# Patient Record
Sex: FEMALE | Race: BLACK OR AFRICAN AMERICAN | Employment: FULL TIME | URBAN - NONMETROPOLITAN AREA
[De-identification: names, ages, dates, MRNs, and addresses within clinical notes are randomized per-mention and may not be internally consistent; named-entity substitution may affect disease eponyms.]

---

## 2020-12-16 ENCOUNTER — ANESTHESIA EVENT (OUTPATIENT)
Dept: OPERATING ROOM | Age: 40
End: 2020-12-16
Payer: COMMERCIAL

## 2020-12-17 RX ORDER — CHLORTHALIDONE 25 MG/1
25 TABLET ORAL DAILY
COMMUNITY

## 2020-12-17 NOTE — ANESTHESIA PRE PROCEDURE
Department of Anesthesiology  Preprocedure Note       Name:  Amalia Petty   Age:  36 y.o.  :  1980                                          MRN:  [id number]         Date:  2020      Surgeon: Nieves Small):  Shi Santana DPM    Procedure: Procedure(s):  RIGHT 1ST METCUNIEFORM FUSION FOOT ARTHRODESIS  RIGHT GILLILAND FOOT BUNIONECTOMY    Medications prior to admission:   Prior to Admission medications    Medication Sig Start Date End Date Taking? Authorizing Provider   chlorthalidone (HYGROTON) 25 MG tablet Take 25 mg by mouth daily   Yes Historical Provider, MD   Flaxseed, Linseed, (FLAX SEED OIL PO) Take 1 drop by mouth Takes 1 dropper full   Yes Historical Provider, MD       Current medications:    No current facility-administered medications for this encounter. Current Outpatient Medications   Medication Sig Dispense Refill    chlorthalidone (HYGROTON) 25 MG tablet Take 25 mg by mouth daily      Flaxseed, Linseed, (FLAX SEED OIL PO) Take 1 drop by mouth Takes 1 dropper full         Allergies: Allergies   Allergen Reactions    Pcn [Penicillins] Hives       Problem List:  There is no problem list on file for this patient.       Past Medical History:        Diagnosis Date    Hypertension        Past Surgical History:        Procedure Laterality Date    ECTOPIC PREGNANCY SURGERY  2006   Floydene Ada SINUS SURGERY  2009    TUBAL LIGATION         Social History:    Social History     Tobacco Use    Smoking status: Never Smoker    Smokeless tobacco: Never Used   Substance Use Topics    Alcohol use: Yes     Comment: 4 bottles of wine/month                                Counseling given: Not Answered      Vital Signs (Current):   Vitals:    20 0949   Weight: 230 lb (104.3 kg)   Height: 5' 1\" (1.549 m)                                              BP Readings from Last 3 Encounters:   No data found for BP       NPO Status: BMI:   Wt Readings from Last 3 Encounters:   No data found for Wt     Body mass index is 43.46 kg/m². CBC: No results found for: WBC, RBC, HGB, HCT, MCV, RDW, PLT    CMP: No results found for: NA, K, CL, CO2, BUN, CREATININE, GFRAA, AGRATIO, LABGLOM, GLUCOSE, PROT, CALCIUM, BILITOT, ALKPHOS, AST, ALT    POC Tests: No results for input(s): POCGLU, POCNA, POCK, POCCL, POCBUN, POCHEMO, POCHCT in the last 72 hours. Coags: No results found for: PROTIME, INR, APTT    HCG (If Applicable): No results found for: PREGTESTUR, PREGSERUM, HCG, HCGQUANT     ABGs: No results found for: PHART, PO2ART, MVH4BSV, OHD7VTL, BEART, L0CAESPD     Type & Screen (If Applicable):  No results found for: LABABO, LABRH    Drug/Infectious Status (If Applicable):  No results found for: HIV, HEPCAB    COVID-19 Screening (If Applicable): No results found for: COVID19      Anesthesia Evaluation  Patient summary reviewed and Nursing notes reviewed no history of anesthetic complications:   Airway:         Dental:          Pulmonary:Negative Pulmonary ROS                              Cardiovascular:    (+) hypertension:,          Beta Blocker:  Not on Beta Blocker         Neuro/Psych:   Negative Neuro/Psych ROS              GI/Hepatic/Renal:   (+) morbid obesity          Endo/Other: Negative Endo/Other ROS                    Abdominal:           Vascular: negative vascular ROS. Anesthesia Plan      general     ASA 2     (Chart review only.)  Induction: intravenous.                           DENZEL Luciaon CRNA   12/17/2020

## 2020-12-18 ENCOUNTER — HOSPITAL ENCOUNTER (OUTPATIENT)
Age: 40
Setting detail: OUTPATIENT SURGERY
Discharge: HOME | End: 2020-12-18
Attending: PODIATRIST | Admitting: PODIATRIST
Payer: COMMERCIAL

## 2020-12-18 ENCOUNTER — ANESTHESIA (OUTPATIENT)
Dept: OPERATING ROOM | Age: 40
End: 2020-12-18
Payer: COMMERCIAL

## 2020-12-18 ENCOUNTER — APPOINTMENT (OUTPATIENT)
Dept: GENERAL RADIOLOGY | Age: 40
End: 2020-12-18
Attending: PODIATRIST
Payer: COMMERCIAL

## 2020-12-18 VITALS
HEART RATE: 87 BPM | OXYGEN SATURATION: 99 % | WEIGHT: 230 LBS | HEIGHT: 61 IN | TEMPERATURE: 96.3 F | BODY MASS INDEX: 43.43 KG/M2 | RESPIRATION RATE: 16 BRPM | SYSTOLIC BLOOD PRESSURE: 133 MMHG | DIASTOLIC BLOOD PRESSURE: 91 MMHG

## 2020-12-18 VITALS
DIASTOLIC BLOOD PRESSURE: 58 MMHG | TEMPERATURE: 98.6 F | OXYGEN SATURATION: 100 % | SYSTOLIC BLOOD PRESSURE: 112 MMHG | RESPIRATION RATE: 2 BRPM

## 2020-12-18 LAB — PREGNANCY TEST URINE, POC: NEGATIVE

## 2020-12-18 PROCEDURE — 3700000000 HC ANESTHESIA ATTENDED CARE: Performed by: PODIATRIST

## 2020-12-18 PROCEDURE — 6360000002 HC RX W HCPCS: Performed by: NURSE ANESTHETIST, CERTIFIED REGISTERED

## 2020-12-18 PROCEDURE — 73630 X-RAY EXAM OF FOOT: CPT

## 2020-12-18 PROCEDURE — 7100000010 HC PHASE II RECOVERY - FIRST 15 MIN: Performed by: PODIATRIST

## 2020-12-18 PROCEDURE — 2580000003 HC RX 258: Performed by: PODIATRIST

## 2020-12-18 PROCEDURE — 81025 URINE PREGNANCY TEST: CPT

## 2020-12-18 PROCEDURE — C1713 ANCHOR/SCREW BN/BN,TIS/BN: HCPCS | Performed by: PODIATRIST

## 2020-12-18 PROCEDURE — 2500000003 HC RX 250 WO HCPCS: Performed by: PODIATRIST

## 2020-12-18 PROCEDURE — 2720000010 HC SURG SUPPLY STERILE: Performed by: PODIATRIST

## 2020-12-18 PROCEDURE — 7100000011 HC PHASE II RECOVERY - ADDTL 15 MIN: Performed by: PODIATRIST

## 2020-12-18 PROCEDURE — 6370000000 HC RX 637 (ALT 250 FOR IP): Performed by: PODIATRIST

## 2020-12-18 PROCEDURE — 7100000000 HC PACU RECOVERY - FIRST 15 MIN: Performed by: PODIATRIST

## 2020-12-18 PROCEDURE — 7100000001 HC PACU RECOVERY - ADDTL 15 MIN: Performed by: PODIATRIST

## 2020-12-18 PROCEDURE — 3600000014 HC SURGERY LEVEL 4 ADDTL 15MIN: Performed by: PODIATRIST

## 2020-12-18 PROCEDURE — 3700000001 HC ADD 15 MINUTES (ANESTHESIA): Performed by: PODIATRIST

## 2020-12-18 PROCEDURE — 3600000004 HC SURGERY LEVEL 4 BASE: Performed by: PODIATRIST

## 2020-12-18 PROCEDURE — 2709999900 HC NON-CHARGEABLE SUPPLY: Performed by: PODIATRIST

## 2020-12-18 DEVICE — ANATOMIC BIPLANAR IMPLANTS
Type: IMPLANTABLE DEVICE | Site: FOOT | Status: FUNCTIONAL
Brand: LAPIPLASTY SYSTEM 2

## 2020-12-18 RX ORDER — DEXAMETHASONE SODIUM PHOSPHATE 4 MG/ML
INJECTION, SOLUTION INTRA-ARTICULAR; INTRALESIONAL; INTRAMUSCULAR; INTRAVENOUS; SOFT TISSUE PRN
Status: DISCONTINUED | OUTPATIENT
Start: 2020-12-18 | End: 2020-12-18 | Stop reason: SDUPTHER

## 2020-12-18 RX ORDER — FENTANYL CITRATE 50 UG/ML
INJECTION, SOLUTION INTRAMUSCULAR; INTRAVENOUS PRN
Status: DISCONTINUED | OUTPATIENT
Start: 2020-12-18 | End: 2020-12-18 | Stop reason: SDUPTHER

## 2020-12-18 RX ORDER — OXYCODONE HYDROCHLORIDE AND ACETAMINOPHEN 5; 325 MG/1; MG/1
1 TABLET ORAL ONCE
Status: COMPLETED | OUTPATIENT
Start: 2020-12-18 | End: 2020-12-18

## 2020-12-18 RX ORDER — LIDOCAINE HYDROCHLORIDE 20 MG/ML
INJECTION, SOLUTION INTRAVENOUS PRN
Status: DISCONTINUED | OUTPATIENT
Start: 2020-12-18 | End: 2020-12-18 | Stop reason: SDUPTHER

## 2020-12-18 RX ORDER — MIDAZOLAM HYDROCHLORIDE 1 MG/ML
INJECTION INTRAMUSCULAR; INTRAVENOUS PRN
Status: DISCONTINUED | OUTPATIENT
Start: 2020-12-18 | End: 2020-12-18 | Stop reason: SDUPTHER

## 2020-12-18 RX ORDER — FENTANYL CITRATE 50 UG/ML
25 INJECTION, SOLUTION INTRAMUSCULAR; INTRAVENOUS EVERY 5 MIN PRN
Status: DISCONTINUED | OUTPATIENT
Start: 2020-12-18 | End: 2020-12-18 | Stop reason: HOSPADM

## 2020-12-18 RX ORDER — ONDANSETRON 2 MG/ML
INJECTION INTRAMUSCULAR; INTRAVENOUS PRN
Status: DISCONTINUED | OUTPATIENT
Start: 2020-12-18 | End: 2020-12-18 | Stop reason: SDUPTHER

## 2020-12-18 RX ORDER — CLINDAMYCIN PHOSPHATE 900 MG/50ML
900 INJECTION INTRAVENOUS ONCE
Status: COMPLETED | OUTPATIENT
Start: 2020-12-18 | End: 2020-12-18

## 2020-12-18 RX ORDER — KETOROLAC TROMETHAMINE 30 MG/ML
INJECTION, SOLUTION INTRAMUSCULAR; INTRAVENOUS PRN
Status: DISCONTINUED | OUTPATIENT
Start: 2020-12-18 | End: 2020-12-18 | Stop reason: SDUPTHER

## 2020-12-18 RX ORDER — SODIUM CHLORIDE 0.9 % (FLUSH) 0.9 %
10 SYRINGE (ML) INJECTION ONCE
Status: COMPLETED | OUTPATIENT
Start: 2020-12-18 | End: 2020-12-18

## 2020-12-18 RX ORDER — SODIUM CHLORIDE, SODIUM LACTATE, POTASSIUM CHLORIDE, CALCIUM CHLORIDE 600; 310; 30; 20 MG/100ML; MG/100ML; MG/100ML; MG/100ML
INJECTION, SOLUTION INTRAVENOUS CONTINUOUS
Status: DISCONTINUED | OUTPATIENT
Start: 2020-12-18 | End: 2020-12-18 | Stop reason: HOSPADM

## 2020-12-18 RX ORDER — PROPOFOL 10 MG/ML
INJECTION, EMULSION INTRAVENOUS PRN
Status: DISCONTINUED | OUTPATIENT
Start: 2020-12-18 | End: 2020-12-18 | Stop reason: SDUPTHER

## 2020-12-18 RX ORDER — ONDANSETRON 2 MG/ML
4 INJECTION INTRAMUSCULAR; INTRAVENOUS
Status: DISCONTINUED | OUTPATIENT
Start: 2020-12-18 | End: 2020-12-18 | Stop reason: HOSPADM

## 2020-12-18 RX ORDER — BUPIVACAINE HYDROCHLORIDE 5 MG/ML
INJECTION, SOLUTION EPIDURAL; INTRACAUDAL
Status: COMPLETED | OUTPATIENT
Start: 2020-12-18 | End: 2020-12-18

## 2020-12-18 RX ADMIN — MIDAZOLAM 2 MG: 1 INJECTION INTRAMUSCULAR; INTRAVENOUS at 07:51

## 2020-12-18 RX ADMIN — FENTANYL CITRATE 50 MCG: 50 INJECTION INTRAMUSCULAR; INTRAVENOUS at 08:08

## 2020-12-18 RX ADMIN — PROPOFOL 200 MG: 10 INJECTION, EMULSION INTRAVENOUS at 07:58

## 2020-12-18 RX ADMIN — FENTANYL CITRATE 50 MCG: 50 INJECTION INTRAMUSCULAR; INTRAVENOUS at 08:12

## 2020-12-18 RX ADMIN — FENTANYL CITRATE 25 MCG: 50 INJECTION INTRAMUSCULAR; INTRAVENOUS at 09:52

## 2020-12-18 RX ADMIN — FENTANYL CITRATE 25 MCG: 50 INJECTION INTRAMUSCULAR; INTRAVENOUS at 09:20

## 2020-12-18 RX ADMIN — LIDOCAINE HYDROCHLORIDE 100 MG: 20 INJECTION, SOLUTION INTRAVENOUS at 07:58

## 2020-12-18 RX ADMIN — DEXAMETHASONE SODIUM PHOSPHATE 8 MG: 4 INJECTION, SOLUTION INTRAMUSCULAR; INTRAVENOUS at 08:08

## 2020-12-18 RX ADMIN — ONDANSETRON HYDROCHLORIDE 4 MG: 4 INJECTION, SOLUTION INTRAMUSCULAR; INTRAVENOUS at 08:38

## 2020-12-18 RX ADMIN — FENTANYL CITRATE 25 MCG: 50 INJECTION INTRAMUSCULAR; INTRAVENOUS at 09:36

## 2020-12-18 RX ADMIN — SODIUM CHLORIDE, POTASSIUM CHLORIDE, SODIUM LACTATE AND CALCIUM CHLORIDE: 600; 310; 30; 20 INJECTION, SOLUTION INTRAVENOUS at 10:13

## 2020-12-18 RX ADMIN — FENTANYL CITRATE 100 MCG: 50 INJECTION INTRAMUSCULAR; INTRAVENOUS at 07:56

## 2020-12-18 RX ADMIN — CLINDAMYCIN IN 5 PERCENT DEXTROSE 900 MG: 18 INJECTION, SOLUTION INTRAVENOUS at 07:11

## 2020-12-18 RX ADMIN — SODIUM CHLORIDE, POTASSIUM CHLORIDE, SODIUM LACTATE AND CALCIUM CHLORIDE: 600; 310; 30; 20 INJECTION, SOLUTION INTRAVENOUS at 07:05

## 2020-12-18 RX ADMIN — CLINDAMYCIN IN 5 PERCENT DEXTROSE 900 MG: 18 INJECTION, SOLUTION INTRAVENOUS at 08:06

## 2020-12-18 RX ADMIN — FENTANYL CITRATE 25 MCG: 50 INJECTION INTRAMUSCULAR; INTRAVENOUS at 08:46

## 2020-12-18 RX ADMIN — KETOROLAC TROMETHAMINE 30 MG: 30 INJECTION, SOLUTION INTRAMUSCULAR; INTRAVENOUS at 10:11

## 2020-12-18 RX ADMIN — SODIUM CHLORIDE, PRESERVATIVE FREE 10 ML: 5 INJECTION INTRAVENOUS at 07:05

## 2020-12-18 RX ADMIN — OXYCODONE AND ACETAMINOPHEN 1 TABLET: 5; 325 TABLET ORAL at 11:22

## 2020-12-18 ASSESSMENT — PULMONARY FUNCTION TESTS
PIF_VALUE: 6
PIF_VALUE: 22
PIF_VALUE: 22
PIF_VALUE: 18
PIF_VALUE: 19
PIF_VALUE: 0
PIF_VALUE: 0
PIF_VALUE: 19
PIF_VALUE: 28
PIF_VALUE: 19
PIF_VALUE: 19
PIF_VALUE: 29
PIF_VALUE: 19
PIF_VALUE: 28
PIF_VALUE: 19
PIF_VALUE: 16
PIF_VALUE: 19
PIF_VALUE: 30
PIF_VALUE: 21
PIF_VALUE: 20
PIF_VALUE: 20
PIF_VALUE: 23
PIF_VALUE: 19
PIF_VALUE: 20
PIF_VALUE: 19
PIF_VALUE: 19
PIF_VALUE: 22
PIF_VALUE: 19
PIF_VALUE: 20
PIF_VALUE: 19
PIF_VALUE: 20
PIF_VALUE: 19
PIF_VALUE: 18
PIF_VALUE: 19
PIF_VALUE: 16
PIF_VALUE: 19
PIF_VALUE: 0
PIF_VALUE: 19
PIF_VALUE: 18
PIF_VALUE: 30
PIF_VALUE: 19
PIF_VALUE: 18
PIF_VALUE: 0
PIF_VALUE: 19
PIF_VALUE: 18
PIF_VALUE: 3
PIF_VALUE: 28
PIF_VALUE: 19
PIF_VALUE: 16
PIF_VALUE: 35
PIF_VALUE: 21
PIF_VALUE: 19
PIF_VALUE: 1
PIF_VALUE: 18
PIF_VALUE: 19
PIF_VALUE: 0
PIF_VALUE: 19
PIF_VALUE: 22
PIF_VALUE: 18
PIF_VALUE: 17
PIF_VALUE: 18
PIF_VALUE: 1
PIF_VALUE: 19
PIF_VALUE: 22
PIF_VALUE: 19
PIF_VALUE: 20
PIF_VALUE: 19
PIF_VALUE: 26
PIF_VALUE: 30
PIF_VALUE: 0
PIF_VALUE: 19
PIF_VALUE: 27
PIF_VALUE: 18
PIF_VALUE: 36
PIF_VALUE: 19
PIF_VALUE: 19
PIF_VALUE: 0
PIF_VALUE: 16
PIF_VALUE: 19
PIF_VALUE: 21
PIF_VALUE: 19
PIF_VALUE: 22
PIF_VALUE: 6
PIF_VALUE: 2
PIF_VALUE: 19
PIF_VALUE: 25
PIF_VALUE: 19
PIF_VALUE: 18

## 2020-12-18 ASSESSMENT — PAIN SCALES - GENERAL
PAINLEVEL_OUTOF10: 3
PAINLEVEL_OUTOF10: 3
PAINLEVEL_OUTOF10: 5
PAINLEVEL_OUTOF10: 0
PAINLEVEL_OUTOF10: 5

## 2020-12-18 ASSESSMENT — PAIN - FUNCTIONAL ASSESSMENT
PAIN_FUNCTIONAL_ASSESSMENT: 0-10
PAIN_FUNCTIONAL_ASSESSMENT: 0-10

## 2020-12-18 NOTE — CONSULTS
..The patient was counseled at length about the risks of lula Covid-19 during their perioperative period and any recovery window from their procedure. The patient was made aware that lula Covid-19  may worsen their prognosis for recovering from their procedure  and lend to a higher morbidity and/or mortality risk. All material risks, benefits, and reasonable alternatives including postponing the procedure were discussed. The patient does wish to proceed with the procedure at this time.

## 2020-12-18 NOTE — PROGRESS NOTES
Pt. Arrived in PACU via cart from the OR. Oxygen via NC @ 4L applied, attached to monitor. Vs stable. Pt. [place] Gutter, but arouses to voice and touch. Resting quietly with eyes closed, resps easy and unlabored. No pain or nausea. Incision to right foot x 3, covered with dry/intact dressings. Ice pack placed. No drainage noted. IV infusing without difficulty. SCD to LLE. Second nurse in PACU is Myrtle Rivers RN. Will continue to monitor via bedside.

## 2020-12-18 NOTE — BRIEF OP NOTE
Brief Postoperative Note      Patient: Robert Padilla  YOB: 1980  MRN: [id number]    Date of Procedure: 12/18/2020    Pre-Op Diagnosis: Acquired hallux valgus of right foot [M20.11], Preop examination [Z01.818]    Post-Op Diagnosis: Same       Procedure(s):  RIGHT 1ST METATARSAL CUNIEFORM FUSION AND BONE GRAFT HARVEST  RIGHT GILLILAND FOOT BUNIONECTOMY    Surgeon(s):  Maris Casas DPM    Assistant:  * No surgical staff found *    Anesthesia: General    Estimated Blood Loss (mL): Minimal    Complications: None    Specimens:   * No specimens in log *    Implants:  Implant Name Type Inv.  Item Serial No.  Lot No. LRB No. Used Action   SCREW BNE BIPLANAR MAYLIN FOR LAPIPLASTY SYS 2  SCREW BNE BIPLANAR MAYLIN FOR LAPIPLASTY SYS 2  [employer]- [id number] Right 1 Implanted         Drains: * No LDAs found *    Findings: as expected    Electronically signed by Maris Casas DPM on 12/18/2020 at 10:24 AM

## 2020-12-18 NOTE — ANESTHESIA POSTPROCEDURE EVALUATION
Department of Anesthesiology  Postprocedure Note    Patient: Nikko Seay  MRN: [id number]  YOB: 1980  Date of evaluation: 12/18/2020  Time:  10:27 AM     Procedure Summary     Date: 12/18/20 Room / Location: [address] [place] [address] 01 / [facility]    Anesthesia Start: 8335 Anesthesia Stop: 1027    Procedures:       RIGHT 1ST METATARSAL CUNIEFORM FUSION AND BONE GRAFT HARVEST (Right Foot)      RIGHT GILLILAND FOOT BUNIONECTOMY (Right Foot) Diagnosis:       Acquired hallux valgus of right foot      Preop examination      (Acquired hallux valgus of right foot [M20.11], Preop examination [Z01.818])    Surgeons: Nicole Kim DPM Responsible Provider: DENZEL Walton CRNA    Anesthesia Type: general ASA Status: 2          Anesthesia Type: general    Mehnaz Phase I:      Mehnaz Phase II:      Last vitals: Reviewed and per EMR flowsheets.        Anesthesia Post Evaluation    Patient location during evaluation: PACU  Patient participation: complete - patient participated  Level of consciousness: awake and alert  Pain score: 0  Airway patency: patent  Nausea & Vomiting: no vomiting and no nausea  Complications: no  Cardiovascular status: blood pressure returned to baseline and hemodynamically stable  Respiratory status: acceptable, spontaneous ventilation, nonlabored ventilation and nasal cannula  Hydration status: stable

## 2020-12-18 NOTE — H&P
..Update History & Physical    The patient's History and Physical of December 14, 2020 was reviewed with the patient and I examined the patient. There was no change. The surgical site was confirmed by the patient and me. Plan: The risks, benefits, expected outcome, and alternative to the recommended procedure have been discussed with the patient. Patient understands and wants to proceed with the procedure.      Electronically signed by Richard May DPM on 12/18/2020 at 6:40 AM

## 2020-12-18 NOTE — PROGRESS NOTES
Pt. Awake, alert, and oriented x 4. On room air, vs stable. Dressing to right foot is dry/intact. No drainage noted. Ice pack in place. Heel elevated off of bed. Pt. Denies nausea and is tolerating ice chips. Does c/o some mild, discomfort at 5/10. Will medicate with oral medication in SDS. Pt. Ready for d/c from PACU.

## 2020-12-18 NOTE — ANESTHESIA PRE PROCEDURE
Department of Anesthesiology  Preprocedure Note       Name:  Josefina Huffman   Age:  36 y.o.  :  1980                                          MRN:  [id number]         Date:  2020      Surgeon: Marianna Newberry):  Omar Carroll DPM    Procedure: Procedure(s):  RIGHT 1ST METCUNIEFORM FUSION FOOT ARTHRODESIS  RIGHT GILLILAND FOOT BUNIONECTOMY    Medications prior to admission:   Prior to Admission medications    Medication Sig Start Date End Date Taking? Authorizing Provider   chlorthalidone (HYGROTON) 25 MG tablet Take 25 mg by mouth daily    Historical Provider, MD   Flaxseed, Linseed, (FLAX SEED OIL PO) Take 1 drop by mouth Takes 1 dropper full    Historical Provider, MD       Current medications:    No current facility-administered medications for this visit. No current outpatient medications on file. Facility-Administered Medications Ordered in Other Visits   Medication Dose Route Frequency Provider Last Rate Last Admin    clindamycin (CLEOCIN) 900 mg in dextrose 5 % 50 mL IVPB  900 mg Intravenous Once Omar Carroll, DPM 50 mL/hr at 20 0711 900 mg at 20 0356    lactated ringers infusion   Intravenous Continuous Omar Osier,  mL/hr at 20 0705 New Bag at 20 0705       Allergies: Allergies   Allergen Reactions    Pcn [Penicillins] Hives       Problem List:  There is no problem list on file for this patient.       Past Medical History:        Diagnosis Date    Hypertension        Past Surgical History:        Procedure Laterality Date    ECTOPIC PREGNANCY SURGERY  2006   [employer] SINUS SURGERY  2009    TUBAL LIGATION         Social History:    Social History     Tobacco Use    Smoking status: Never Smoker    Smokeless tobacco: Never Used   Substance Use Topics    Alcohol use: Yes     Comment: 4 bottles of wine/month                                Counseling given: Not Answered      Vital Signs (Current): There were no vitals filed for this visit. BP Readings from Last 3 Encounters:   12/18/20 123/82       NPO Status:                                                                                 BMI:   Wt Readings from Last 3 Encounters:   12/17/20 230 lb (104.3 kg)     There is no height or weight on file to calculate BMI.    CBC: No results found for: WBC, RBC, HGB, HCT, MCV, RDW, PLT    CMP: No results found for: NA, K, CL, CO2, BUN, CREATININE, GFRAA, AGRATIO, LABGLOM, GLUCOSE, PROT, CALCIUM, BILITOT, ALKPHOS, AST, ALT    POC Tests: No results for input(s): POCGLU, POCNA, POCK, POCCL, POCBUN, POCHEMO, POCHCT in the last 72 hours. Coags: No results found for: PROTIME, INR, APTT    HCG (If Applicable):   Lab Results   Component Value Date    PREGTESTUR NEGATIVE 12/18/2020        ABGs: No results found for: PHART, PO2ART, TVR1PPK, DJK7KHN, BEART, Z3KQWDDW     Type & Screen (If Applicable):  No results found for: LABABO, LABRH    Drug/Infectious Status (If Applicable):  No results found for: HIV, HEPCAB    COVID-19 Screening (If Applicable): No results found for: COVID19      Anesthesia Evaluation  Patient summary reviewed and Nursing notes reviewed no history of anesthetic complications:   Airway: Mallampati: II  TM distance: >3 FB   Neck ROM: full  Mouth opening: > = 3 FB Dental:          Pulmonary:Negative Pulmonary ROS and normal exam                               Cardiovascular:  Exercise tolerance: good (>4 METS),   (+) hypertension:,         Rhythm: regular  Rate: normal           Beta Blocker:  Not on Beta Blocker         Neuro/Psych:   Negative Neuro/Psych ROS              GI/Hepatic/Renal:   (+) morbid obesity          Endo/Other: Negative Endo/Other ROS                    Abdominal:   (+) obese,         Vascular: negative vascular ROS.                                          Anesthesia Plan      general     ASA 2     (Chart review only.) Induction: intravenous. Anesthetic plan and risks discussed with patient. Plan discussed with CRNA.                   DENZEL Caraballo - OBINNA   12/18/2020

## 2020-12-18 NOTE — PROGRESS NOTES
Patient with complaints of pain at the surgical site with a pain scale rating of 5. One percocet pill given PO. Will continue to monitor the patient closely and reassess for pain.

## 2020-12-19 NOTE — OP NOTE
[facility]                  [address], [address] [facility]                                OPERATIVE REPORT    PATIENT NAME: ROXANNE KRUEGER                      :        1980  MED REC NO:   [id number]                          ROOM:  ACCOUNT NO:   [de-identified]                           ADMIT DATE: 2020  PROVIDER:     Henny Tello DPM    DATE OF PROCEDURE:  2020    ATTENDING PROVIDER:  Henny Tello DPM    PREOPERATIVE DIAGNOSIS:  Hallux abductovalgus deformity on the right. POSTOPERATIVE DIAGNOSIS:  Hallux abductovalgus deformity on the right. OPERATION PERFORMED:  Metatarsal cuneiform fusion/Treace, bunionectomy  with Barnes, bunionectomy and harvesting of bone graft from the  calcaneus on the right. ANESTHESIA:  General.    HEMOSTASIS:  Pneumatic ankle tourniquet set at 250 mmHg. ESTIMATED BLOOD LOSS:  5 mL. MATERIALS:  Two Treace plates and four 70-DF screws and four 14-mm gold  screws. POSTOPERATIVE INJECTION:  20 mL in a Adair block technique, 18 of it for  the block and 2 of it for the lateral calcaneus. COMPLICATIONS:  None. CONDITION:  Stable. INDICATIONS FOR THE PROCEDURE:  The patient is a very pleasant  27-year-old female who has been seen in my office for quite some time. The patient has tried and failed conservative therapy. The patient  opted at this time to proceed with surgical intervention. The patient  understands the risks of COVID as well and wishes to proceed. Consent  was signed and submitted to chart. DESCRIPTION OF PROCEDURE:  Under mild sedation, the patient was brought  to the OR and placed on the operating room table in the supine position. Following induction of general anesthesia, the right foot was prepped,  scrubbed, and draped in normal aseptic technique. Attention was then  directed to the right lateral calcaneus where we did a linear incision. I made sure to avoid the sural nerve. We went down through blunt  dissection with a curved stat and then harvested with a FastGrafter from  the 1 mL of bone graft from the lateral calcaneus. We then closed this  region with 4-0 nylon. We then went to the dorsal aspect, we went just  medial to the EHL tendon. We did one longitudinal incision, went down  through to the skin and subcutaneous layer with care to cauterize all  bleeders. We went down further to the level of the bone to the capsule. We did free up the capsule for the hallux as well down to that region. We took care to cauterize all bleeders. We then followed our steps for  the Treace bunionectomy and first thing was, we did plating of the first  met base and cuneiform articular surface. We then applied our medial  pin. We put the first MPJ through a range of motion and isolated that  range of motion and saw that we could reduce the sesamoids. Next, we  put on our compressor, _____ with appropriate incision. We went down  and then added our fulcrum and got apposition and alignment of our  screws. We then put on our cut guide and made our appropriate cuts and  made sure that we removed the medial cuneiform bone and also the base of  the first metatarsal _____. We then evaluated. We then irrigated. We  then prepped the joint with a subchondral drilling and drilling into the  bone. We then introduced our bone graft into the site. It should be  noted that we had put on our compressor prior to this. We got our  position, put on the compressor prior to the olive wire to hold the  whole thing into position. We took several pictures to confirm position  alignment both on AP and lateral to make sure we did not have any  elevation and no gapping to the plantar region of the met cuneiform  articulation. Next, we put on our screws and plates with proper AO  technique.   We removed everything and confirmed our position alignment, our position alignment looked really good. At this time, we went ahead  and resected our medial eminent. She had a large medial eminence. We  did our lateral leads to let the sesamoids come over just a little bit  more. We irrigated that distal portion. We then did closure for the  capsule with 2-0 pop-off sutures. We did then subcutaneous and  subcuticular region with 3-0 and 4-0 Vicryl and then the skin with 4-0  nylon. We had excellent position alignment. We confirmed this under  fluoroscopic guidance. We then injected the above-listed injectable. We then dressed with Betadine-soaked Adaptic, 4x4's, Kerlix, and an Ace. The patient tolerated the procedure and anesthesia well and left the OR  with vital signs stable and vascular status intact to the right foot. The patient will be seen in our office next week for a followup  appointment. She is to call if she has any problems, questions, or  concerns. In the meantime, she has two pages that will go home with her  and with specific instructions. She has Percocet one every six hours  for pain. I will give her a courtesy call on Sunday to check on her to  see how she is doing, and we shall go from there.         Zora Altamirano DPM    D: 12/18/2020 10:49:42       T: 12/18/2020 15:30:35     [email or web address]  Job#: [id number]     Doc#: [id number]    CC:

## (undated) DEVICE — CONVERTORS STOCKINETTE: Brand: CONVERTORS

## (undated) DEVICE — BANDAGE,GAUZE,BULKEE II,4.5"X4.1YD,STRL: Brand: MEDLINE

## (undated) DEVICE — GAUZE,SPONGE,4"X4",16PLY,XRAY,STRL,LF: Brand: MEDLINE

## (undated) DEVICE — BANDAGE,ELASTIC,ESMARK,STERILE,4"X9',LF: Brand: MEDLINE

## (undated) DEVICE — CHLORAPREP 26ML ORANGE

## (undated) DEVICE — AUTOGRAFT HARVESTING SYSTEM: Brand: FASTGRAFTER

## (undated) DEVICE — PACK,BASIC,IX: Brand: MEDLINE

## (undated) DEVICE — SUTURE VCRL SZ 2-0 L18IN ABSRB UD CT-1 L36MM 1/2 CIR J839D

## (undated) DEVICE — ELECTRODE ES AD CRDLSS PT RET REM POLYHESIVE

## (undated) DEVICE — SUTURE VCRL SZ 4-0 L18IN ABSRB UD L13MM P-3 3/8 CIR PRIM J494H

## (undated) DEVICE — PENCIL,CAUTERY,ROCKER,PTFE,15'CORD: Brand: MEDLINE INDUSTRIES, INC.

## (undated) DEVICE — SOLUTION PREP POVIDONE IOD FOR SKIN MUCOUS MEM PRIOR TO

## (undated) DEVICE — DRAPE,EXTREMITY,89X128,STERILE: Brand: MEDLINE

## (undated) DEVICE — TOWEL,OR,DSP,ST,BLUE,STD,6/PK,12PK/CS: Brand: MEDLINE

## (undated) DEVICE — SNAP KOVER: Brand: UNBRANDED

## (undated) DEVICE — TUBING, SUCTION, 1/4" X 10', STRAIGHT: Brand: MEDLINE

## (undated) DEVICE — BANDAGE,SELF ADHRNT,COFLEX,4"X5YD,STRL: Brand: COLABEL

## (undated) DEVICE — ELECTRODE,RADIOTRANSLUCENT,FOAM,5PK: Brand: MEDLINE

## (undated) DEVICE — SYRINGE MED 10ML LUERLOCK TIP W/O SFTY DISP

## (undated) DEVICE — SYRINGE IRRIG 60ML SFT PLIABLE BLB EZ TO GRP 1 HND USE W/

## (undated) DEVICE — SUTURE VCRL SZ 3-0 L27IN ABSRB UD L17MM RB-1 1/2 CIR J215H

## (undated) DEVICE — NEEDLE HYPO 25GA L1.5IN BLU POLYPR HUB S STL REG BVL STR

## (undated) DEVICE — SUTURE ETHLN SZ 4-0 L18IN NONABSORBABLE BLK L13MM P-3 3/8 699G

## (undated) DEVICE — CURITY NON-ADHERENT STRIPS: Brand: CURITY

## (undated) DEVICE — BANDAGE COMPR W4INXL5YD WHT BGE POLY COT M E WRP WV HK AND

## (undated) DEVICE — ANESTHESIA CIRCUIT ADULT-LF: Brand: MEDLINE INDUSTRIES, INC.

## (undated) DEVICE — MARKER SURG SKIN UTIL REGULAR/FINE 2 TIP W/ RUL AND 9 LBL

## (undated) DEVICE — SPONGE GZ W4XL8IN COT WVN 12 PLY

## (undated) DEVICE — COUNTER NDL 30 COUNT FOAM STRP SGL MAG

## (undated) DEVICE — LINER SUCT CANSTR 1500CC SEMI RIG W/ POR HYDROPHOBIC SHUT

## (undated) DEVICE — BLADE SURG NO15 C STL SHRP PREM

## (undated) DEVICE — YANKAUER,FLEXIBLE HANDLE,REGLR CAPACITY: Brand: MEDLINE INDUSTRIES, INC.

## (undated) DEVICE — BLADE SAW 40X11X0.38 MM REPL 8.7 CM TEETH LAPIPLASTY

## (undated) DEVICE — STERILE LATEX POWDER-FREE SURGICAL GLOVESWITH NITRILE COATING: Brand: PROTEXIS